# Patient Record
(demographics unavailable — no encounter records)

---

## 2024-12-24 NOTE — PLAN
[FreeTextEntry1] : Routine Gyn Exam: BSA, calcium, vitamin D and exercise d/w pt Pap smear/HPV conducted  Advised pt to schedule colonoscopy, referred to GI Advised pt to see PCP and dermatologist annually  Elevated TC Score, Strong FHx breast ca: Advised breast MRI, Rx given Rx given for mammogram and breast sonogram Advised pt f/u with breast specialist Dr. Islas Encouraged cancer genetic testing, to schedule consult  RTO in 1 year or PRN

## 2024-12-24 NOTE — PHYSICAL EXAM
[Chaperone Present] : A chaperone was present in the examining room during all aspects of the physical examination [[id number]] : A chaperone was present during the pelvic exam. [Appropriately responsive] : appropriately responsive [Alert] : alert [No Acute Distress] : no acute distress [No Lymphadenopathy] : no lymphadenopathy [Regular Rate Rhythm] : regular rate rhythm [No Murmurs] : no murmurs [Clear to Auscultation B/L] : clear to auscultation bilaterally [Soft] : soft [Non-tender] : non-tender [Non-distended] : non-distended [No HSM] : No HSM [No Lesions] : no lesions [No Mass] : no mass [Oriented x3] : oriented x3 [Examination Of The Breasts] : a normal appearance [No Masses] : no breast masses were palpable [Labia Majora] : normal [Labia Minora] : normal [Normal] : normal [Uterine Adnexae] : normal [FreeTextEntry2] : [facility] [FreeTextEntry7] : umbilical and inguinal hernia  [FreeTextEntry9] : no masses, guaiac negative

## 2024-12-24 NOTE — HISTORY OF PRESENT ILLNESS
[FreeTextEntry1] : 2024. KORIN DEVI 45 year old female  LMP 24. She presents for an annual gyn exam.   She reports monthly menses, not too heavy, not painful. She denies intermenstrual bleeding.   She denies abdominal and pelvic pain, no vaginal discharge or vaginitis symptoms. She reports normal urination, no dysuria, no incontinence of urine. BM is normal per patient, no blood in stool or constipation/diarrhea.  She stopped breast feeding in 2024. Denies breast changes or pain. Saw Dr. Kan for umbilical/inguinal hernia - pt will defer surgical repair as they are not bothersome at this time.  No new medical conditions, medications or surgeries.   Obhx:  c/s x1 in 2019 son Jeremi, spontaneous AB x1 2017,  2023 - girl GYNhx: Hx of HPV, benign colp. Denies history of fibroids, STI, pelvic infection, breast issues PMH: Umbilical and right inguinal Hernia- she saw general surgeon and will repair after done with childbearing. PSH: L thumb surgery in . C/s x2 Med: probiotic, vitamin C, multivit, zinc, Mg, CoQ10, turmeric, lion's james All: NKDA Famhx: Mother w/ breast ca in her 50's BRCA negative. MGM w/ breast ca in 50's. Denies FHx of ovarian, uterine, colon, pancreatic or prostate cancer. SocHx: social alcohol use, nonsmoker, no T/D. s/p Covid vaccines. PHQ9 = 0  [Mammogramdate] : 06/22 [TextBox_19] : TC score 45.6%, BIRADS 2 [BreastSonogramDate] : 06/22 [TextBox_25] : BIRADS 2 [PapSmeardate] : 09/23 [TextBox_31] : NILM HPV not detected